# Patient Record
Sex: MALE | Race: WHITE
[De-identification: names, ages, dates, MRNs, and addresses within clinical notes are randomized per-mention and may not be internally consistent; named-entity substitution may affect disease eponyms.]

---

## 2018-07-24 ENCOUNTER — HOSPITAL ENCOUNTER (EMERGENCY)
Dept: HOSPITAL 31 - C.ER | Age: 38
Discharge: HOME | End: 2018-07-24
Payer: MEDICAID

## 2018-07-24 VITALS
DIASTOLIC BLOOD PRESSURE: 80 MMHG | SYSTOLIC BLOOD PRESSURE: 128 MMHG | HEART RATE: 79 BPM | OXYGEN SATURATION: 97 % | TEMPERATURE: 98.2 F | RESPIRATION RATE: 17 BRPM

## 2018-07-24 DIAGNOSIS — Z48.02: Primary | ICD-10-CM

## 2018-07-24 NOTE — C.PDOC
History Of Present Illness


38 y/o male presented to the ED after getting out of work to have staples 

removed. Patient reportedly had staples placed at another institution 2 weeks 

ago, but came here because it is closer to his work place. Patient offers no 

other medical complaints.


Time Seen by Provider: 07/24/18 18:00


Chief Complaint (Nursing): Suture/Staple Removal


History Per: Patient


History/Exam Limitations: no limitations


Onset/Duration Of Symptoms: Days Ago


Location Of Injury: Left: Wrist





Past Medical History


Reviewed: Historical Data, Nursing Documentation, Vital Signs


Vital Signs: 


 Last Vital Signs











Temp  98.2 F   07/24/18 17:55


 


Pulse  79   07/24/18 17:55


 


Resp  17   07/24/18 17:55


 


BP  128/80   07/24/18 17:55


 


Pulse Ox  97   07/24/18 18:10











Surgical History: Tonsillectomy





- [facility] Procedures








CLOSURE SKIN & SUBCUTANEOUS NEC (07/20/15)


TETANUS TOXOID ADMINIST (07/20/15)








Family History: States: Unknown Family Hx





- Social History


Hx Tobacco Use: No


Hx Alcohol Use: No


Hx Substance Use: No





- Immunization History


Hx Tetanus Toxoid Vaccination: Yes (2018)


Hx Influenza Vaccination: Yes


Hx Pneumococcal Vaccination: No





Review Of Systems


Constitutional: Negative for: Fever, Weakness


Neurological: Negative for: Weakness, Numbness





Physical Exam





- Physical Exam


Appears: Non-toxic, No Acute Distress


Skin: Warm, Dry


Head: Atraumatic, Normacephalic


Eye(s): bilateral: Normal Inspection


Neck: Supple


Chest: Symmetrical


Cardiovascular: Rhythm Regular


Respiratory: Normal Breath Sounds


Neurological/Psych: Oriented x3


Gait: Steady





ED Course And Treatment


O2 Sat by Pulse Oximetry: 97 (RA)


Pulse Ox Interpretation: Normal


Progress Note: Staples were removed using removal kit. Patient tolerated 

procedure well. Advised to follow up with PMD in 2-3 days.





Disposition





- Disposition


Referrals: 


[facility] [Outside]


[facility] [Outside]


Disposition: HOME/ ROUTINE


Disposition Time: 06:00


Condition: GOOD


Additional Instructions: 


return to er with worsening symptoms or concerns. 


Instructions:  Staple Removal


Forms:  [employer] (English)





- Clinical Impression


Clinical Impression: 


 Removal of suture








- Scribe Statement


The provider has reviewed the documentation as recorded by the Michael Smith


Provider Attestation: 








All medical record entries made by the Scribe were at my direction and 

personally dictated by me. I have reviewed the chart and agree that the record 

accurately reflects my personal performance of the history, physical exam, 

medical decision making, and the department course for this patient. I have 

also personally directed, reviewed, and agree with the discharge instructions 

and disposition.